# Patient Record
Sex: FEMALE | Race: BLACK OR AFRICAN AMERICAN | NOT HISPANIC OR LATINO | Employment: OTHER | URBAN - METROPOLITAN AREA
[De-identification: names, ages, dates, MRNs, and addresses within clinical notes are randomized per-mention and may not be internally consistent; named-entity substitution may affect disease eponyms.]

---

## 2019-09-09 ENCOUNTER — APPOINTMENT (OUTPATIENT)
Dept: LAB | Facility: HOSPITAL | Age: 64
End: 2019-09-09

## 2019-09-09 ENCOUNTER — CONSULT (OUTPATIENT)
Dept: ONCOLOGY | Facility: CLINIC | Age: 64
End: 2019-09-09

## 2019-09-09 VITALS
HEART RATE: 82 BPM | TEMPERATURE: 97.8 F | BODY MASS INDEX: 33.78 KG/M2 | DIASTOLIC BLOOD PRESSURE: 80 MMHG | SYSTOLIC BLOOD PRESSURE: 127 MMHG | HEIGHT: 66 IN | RESPIRATION RATE: 20 BRPM | WEIGHT: 210.2 LBS

## 2019-09-09 DIAGNOSIS — C50.919 METASTATIC BREAST CANCER: Primary | ICD-10-CM

## 2019-09-09 PROCEDURE — 99205 OFFICE O/P NEW HI 60 MIN: CPT | Performed by: INTERNAL MEDICINE

## 2019-09-09 PROCEDURE — 96450 CHEMOTHERAPY INTO CNS: CPT | Performed by: INTERNAL MEDICINE

## 2019-09-09 PROCEDURE — G0463 HOSPITAL OUTPT CLINIC VISIT: HCPCS | Performed by: INTERNAL MEDICINE

## 2019-09-09 RX ORDER — ANASTROZOLE 1 MG/1
1 TABLET ORAL DAILY
COMMUNITY

## 2019-09-09 RX ORDER — FLUTICASONE PROPIONATE 50 MCG
2 SPRAY, SUSPENSION (ML) NASAL DAILY
COMMUNITY

## 2019-09-09 RX ORDER — POTASSIUM CHLORIDE 750 MG/1
10 TABLET, FILM COATED, EXTENDED RELEASE ORAL 3 TIMES DAILY
COMMUNITY

## 2019-09-09 RX ORDER — METOPROLOL SUCCINATE 50 MG/1
50 TABLET, EXTENDED RELEASE ORAL DAILY
COMMUNITY

## 2019-09-09 RX ORDER — FUROSEMIDE 40 MG/1
40 TABLET ORAL 2 TIMES DAILY
COMMUNITY

## 2019-09-09 RX ORDER — HYDROCODONE BITARTRATE AND ACETAMINOPHEN 10; 325 MG/1; MG/1
1 TABLET ORAL EVERY 6 HOURS PRN
COMMUNITY

## 2019-09-09 RX ORDER — ATORVASTATIN CALCIUM 40 MG/1
40 TABLET, FILM COATED ORAL DAILY
COMMUNITY

## 2019-09-09 RX ORDER — GUAIFENESIN AND DEXTROMETHORPHAN HYDROBROMIDE 100; 10 MG/5ML; MG/5ML
5 SOLUTION ORAL AS NEEDED
COMMUNITY

## 2019-09-09 RX ORDER — CETIRIZINE HYDROCHLORIDE 10 MG/1
10 TABLET ORAL DAILY
COMMUNITY

## 2019-09-09 NOTE — PROGRESS NOTES
Genetic Note    Cassandra Cheng [place]  1955    Primary Care Physician: Provider, No Known  Referring Physician: Provider, No Known  Reason For Visit: High Risk Evaluation For  Breast cancer    Chief Complaint   Patient presents with   • Appointment     New patient for genetic testing       HPI  64-year-old female who has a history of stage II left breast cancer back in 2006.  Patient at the time was treated with left lumpectomy followed by chemotherapy and subsequently radiation treatments.  She received antiestrogen therapy for at least 5 years.  Patient did well without any evidence of relapsed disease up until May 2018 when she was found to have metastatic disease with new right breast cancer.  Patient is currently on Arimidex single agent for her metastatic disease.  Says this is her second primary breast cancer, patient has been referred for further evaluation for possible genetic mutation that could have led to her malignancies and also to help to determine treatment recommendations if she is found to have BRCA1 mutation.  And has no family history of malignancies on both sides of the family.  Both grandparents lived well into their old age he is.  Past patient knows there has not been any reported family history of cancer.  Patient is accompanied today by her family for this appointment.    Past Medical History:   Diagnosis Date   • Breast cancer (CMS/HCC)    • CHF (congestive heart failure) (CMS/HCC)    • Hypertension    • Osteoporosis    • Pre-diabetes        Past Surgical History:   Procedure Laterality Date   • ANKLE SURGERY Right    • BREAST LUMPECTOMY Left    • LAPAROSCOPIC TUBAL LIGATION           Current Outpatient Medications:   •  anastrozole (ARIMIDEX) 1 MG tablet, Take 1 mg by mouth Daily., Disp: , Rfl:   •  atorvastatin (LIPITOR) 40 MG tablet, Take 40 mg by mouth Daily., Disp: , Rfl:   •  Calcium Carb-Cholecalciferol (CALCIUM 1000 + D) 1000-800 MG-UNIT tablet, Take 2 tablets by mouth 2 (Two)  Times a Day., Disp: , Rfl:   •  cetirizine (zyrTEC) 10 MG tablet, Take 10 mg by mouth Daily., Disp: , Rfl:   •  dextromethorphan-guaifenesin (ROBITUSSIN-DM)  MG/5ML syrup, Take 5 mL by mouth As Needed., Disp: , Rfl:   •  fluticasone (FLONASE) 50 MCG/ACT nasal spray, 2 sprays into the nostril(s) as directed by provider Daily., Disp: , Rfl:   •  furosemide (LASIX) 40 MG tablet, Take 40 mg by mouth 2 (Two) Times a Day., Disp: , Rfl:   •  HYDROcodone-acetaminophen (NORCO)  MG per tablet, Take 1 tablet by mouth Every 6 (Six) Hours As Needed for Moderate Pain ., Disp: , Rfl:   •  metFORMIN (GLUCOPHAGE) 500 MG tablet, Take 500 mg by mouth 2 (Two) Times a Day., Disp: , Rfl:   •  metoprolol succinate XL (TOPROL-XL) 50 MG 24 hr tablet, Take 50 mg by mouth Daily. 1/2 TAB DAILY, Disp: , Rfl:   •  Polyethyl Glycol-Polyvinyl Alc 1-1 % solution, Apply 1 drop to eye(s) as directed by provider 4 (Four) Times a Day., Disp: , Rfl:   •  potassium chloride (K-DUR) 10 MEQ CR tablet, Take 10 mEq by mouth 3 (Three) Times a Day., Disp: , Rfl:     No Known Allergies    History reviewed. No pertinent family history.    Cancer-related family history is not on file.    Social History     Tobacco Use   • Smoking status: Former Smoker     Types: Cigarettes     Last attempt to quit: 1979     Years since quittin.7   • Smokeless tobacco: Never Used   Substance Use Topics   • Alcohol use: No     Frequency: Never   • Drug use: No     GYN History:  The patient stated her menses at the age of 10.  Her last menstrual cycle was at the age of 50.  Her menses were regular with heavy blood loss lasting 5-7 days.  The patient was 24 years of age with her first childbirth.  She breast fed her children.  She has no history of taking birth control pills nor hormone replacement.      Review of Systems   Constitutional: Negative for chills and fever.   HENT: Negative for ear pain, mouth sores, nosebleeds and sore throat.    Eyes: Negative for  "photophobia and visual disturbance.   Respiratory: Negative for wheezing and stridor.    Cardiovascular: Negative for chest pain and palpitations.   Gastrointestinal: Negative for abdominal pain, diarrhea, nausea and vomiting.   Endocrine: Negative for cold intolerance and heat intolerance.   Genitourinary: Negative for dysuria and hematuria.   Musculoskeletal: Negative for joint swelling and neck stiffness.   Skin: Negative for color change and rash.   Neurological: Negative for seizures and syncope.   Hematological: Negative for adenopathy.        No obvious bleeding   Psychiatric/Behavioral: Negative for agitation, confusion and hallucinations.       Objective:    Vitals:    09/09/19 1406   BP: 127/80   Pulse: 82   Resp: 20   Temp: 97.8 °F (36.6 °C)   Weight: 95.3 kg (210 lb 3.2 oz)   Height: 167.6 cm (66\")   PainSc: 0-No pain       (1) Restricted in physically strenuous activity, ambulatory and able to do work of light nature    Physical Exam   Constitutional: She is oriented to person, place, and time. No distress.   HENT:   Head: Normocephalic and atraumatic.   Eyes: Conjunctivae and EOM are normal. Right eye exhibits no discharge. Left eye exhibits no discharge. No scleral icterus.   Neck: Normal range of motion. Neck supple. No thyromegaly present.   Cardiovascular: Normal rate, regular rhythm and normal heart sounds. Exam reveals no gallop and no friction rub.   Pulmonary/Chest: Effort normal. No stridor. No respiratory distress. She has no wheezes. Right breast exhibits no inverted nipple, no mass, no nipple discharge, no skin change and no tenderness. Left breast exhibits no inverted nipple, no mass, no nipple discharge, no skin change and no tenderness. Breasts are symmetrical.   Abdominal: Soft. Bowel sounds are normal. She exhibits no mass. There is no tenderness. There is no rebound and no guarding.   Genitourinary: No breast bleeding.   Musculoskeletal: Normal range of motion. She exhibits no " tenderness.   Lymphadenopathy:     She has no cervical adenopathy.   Neurological: She is alert and oriented to person, place, and time. She exhibits normal muscle tone.   Skin: Skin is warm. No rash noted. She is not diaphoretic. No erythema.   Psychiatric: She has a normal mood and affect. Her behavior is normal.   Nursing note and vitals reviewed.      Assessment/Plan     There are no diagnoses linked to this encounter.    1. Personal history of 2 separate breast malignancies worrisome for headache 3 breast cancer syndrome.  2. Metastatic breast cancer  3. Genetic counseling    Discussion:  Patient has had 2 separate breast primaries.  We reviewed indications for genetic testing which would include more than 1 breast cancer in an individual, breast and ovarian cancer in the same individual, cancers occurring in very young individuals, multiple family members having similar malignancies. Due to the fact that she has had 2 separate breast primaries, there is concern for possible hereditary breast cancer syndrome.  Patient is a good candidate to be screened as she has been affected by malignancy.  If she has positive mutation in the BRCA  gene, she may benefit from Parp inhibitors.  She is currently on single agent Arimidex.  I have requested for additional records from the [facility] including her most recent mammogram.    Today's risk assessment is based on the history the patient has provided.  We discussed that the best people to screen are the ones who have been affected by malignancy as their result is more informative.  We reviewed all the hallmarks of hereditary cancer syndrome including multiple relatives on the same side of the family being affected by malignancy, cancer diagnosis in young individuals, different cancers in one individual.      We discussed the implications of a positive deleterious mutation which can result in recommendations for prophylactic surgeries, chemoprevention, lifestyle  modifications and aggressive screening with mammogram and MRI, and also increased breast awareness and breast self exams.  Patient understands if she screens positive, then at-risk family members will need to be screened as well.  Each offspring has a 50% chance of inheriting a deleterious mutation if positive in a parent.      A negative deleterious mutation will make hereditary cancer syndrome much less likely, but does not rule out the possibility of a gene mutation that cannot be detected with the available technology.  She also understands that a negative gene test result might indicate that the cancers that she had was most likely sporadic.    We discussed variant of unknown significance.  Patient understands that no medical decisions will be made based on a variant of unknown significance, but I did stress the importance of maintaining contact information with us should this be the case.      We discussed the financial implications of the above testing.  Patient understands when the right clinical criteria are met that most insurance companies will cover the cost.      We also discussed the various insurability issues with a deleterious mutation result.     Patient has give us consent to proceed with comprehensive genetic analysis through [facility]    She will return to see me in approximately 6 weeks to review the results and to make further recommendations.    Thank you very much allowing me participate in the care of Ms. Dey.  I will keep you updated on her progress.      I spent greater than 40 minutes in face-to-face time with the patient and 95% of that time were spent in counseling and coordination of care, including review of  indications for treatment, goals of therapy, alternatives and risks - both common and rare - as well as surveillance and potential outcomes.                 Part of this document was scribed by Connie Black RN, BSN.        Much of the above report is an electronic  transcription/translation of the spoken language to printed text using Dragon Software. As such, the subtleties and finesse of the spoken language may permit erroneous, or at times, nonsensical words or phrases to be inadvertently transcribed; thus changes may be made at a later date to rectify these errors.

## 2019-10-14 ENCOUNTER — TELEPHONE (OUTPATIENT)
Dept: ONCOLOGY | Facility: CLINIC | Age: 64
End: 2019-10-14

## 2019-10-14 ENCOUNTER — APPOINTMENT (OUTPATIENT)
Dept: LAB | Facility: HOSPITAL | Age: 64
End: 2019-10-14

## 2019-10-14 ENCOUNTER — APPOINTMENT (OUTPATIENT)
Dept: ONCOLOGY | Facility: CLINIC | Age: 64
End: 2019-10-14

## 2019-10-14 NOTE — TELEPHONE ENCOUNTER
Ms. Dey left message to cancel her appt for today.  Returned call, rescheduled for Thursday, 10/17.

## 2019-10-16 NOTE — PROGRESS NOTES
Genetic Note    Cassandra Cheng [place]  1955    Primary Care Physician: Provider, No Known  Referring Physician: Provider, No Known  Reason For Visit: High Risk Evaluation For  Breast cancer    Chief Complaint   Patient presents with   • Follow-up     Discuss genetic results       HPI     64-year-old female who has a history of stage II left breast cancer back in 2006.  Patient at the time was treated with left lumpectomy followed by chemotherapy and subsequently radiation treatments.  She received antiestrogen therapy for at least 5 years.  Patient did well without any evidence of relapsed disease up until May 2018 when she was found to have metastatic disease with new right breast cancer.  Patient is currently on Arimidex single agent for her metastatic disease.  Says this is her second primary breast cancer, patient has been referred for further evaluation for possible genetic mutation that could have led to her malignancies and also to help to determine treatment recommendations if she is found to have BRCA1 mutation.    She has no family history of malignancies on both sides of the family.  Both grandparents lived well into their old ages. Patient  states there has not been any reported family history of cancer.    · 9/9/2019 patient had comprehensive gene analysis with cancer next technology.  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  Otherwise the rest of the genes were essentially unremarkable for any significant mutation.      Past Medical History:   Diagnosis Date   • Breast cancer (CMS/HCC)     x 2 (once in 2006 and then 5/2019)   • CHF (congestive heart failure) (CMS/HCC)    • Hypertension    • Osteoporosis    • Pre-diabetes        Past Surgical History:   Procedure Laterality Date   • ANKLE SURGERY Right    • BREAST LUMPECTOMY Left    • LAPAROSCOPIC TUBAL LIGATION           Current Outpatient Medications:   •  anastrozole (ARIMIDEX) 1 MG  tablet, Take 1 mg by mouth Daily., Disp: , Rfl:   •  atorvastatin (LIPITOR) 40 MG tablet, Take 40 mg by mouth Daily., Disp: , Rfl:   •  Calcium Carb-Cholecalciferol (CALCIUM 1000 + D) 1000-800 MG-UNIT tablet, Take 2 tablets by mouth 2 (Two) Times a Day., Disp: , Rfl:   •  cetirizine (zyrTEC) 10 MG tablet, Take 10 mg by mouth Daily., Disp: , Rfl:   •  dextromethorphan-guaifenesin (ROBITUSSIN-DM)  MG/5ML syrup, Take 5 mL by mouth As Needed., Disp: , Rfl:   •  fluticasone (FLONASE) 50 MCG/ACT nasal spray, 2 sprays into the nostril(s) as directed by provider Daily., Disp: , Rfl:   •  furosemide (LASIX) 40 MG tablet, Take 40 mg by mouth 2 (Two) Times a Day., Disp: , Rfl:   •  HYDROcodone-acetaminophen (NORCO)  MG per tablet, Take 1 tablet by mouth Every 6 (Six) Hours As Needed for Moderate Pain ., Disp: , Rfl:   •  metoprolol succinate XL (TOPROL-XL) 50 MG 24 hr tablet, Take 50 mg by mouth Daily. 1/2 TAB DAILY, Disp: , Rfl:   •  Polyethyl Glycol-Polyvinyl Alc 1-1 % solution, Apply 1 drop to eye(s) as directed by provider 4 (Four) Times a Day., Disp: , Rfl:   •  potassium chloride (K-DUR) 10 MEQ CR tablet, Take 10 mEq by mouth 3 (Three) Times a Day., Disp: , Rfl:   •  metFORMIN (GLUCOPHAGE) 500 MG tablet, Take 500 mg by mouth 2 (Two) Times a Day., Disp: , Rfl:     Allergies   Allergen Reactions   • Amoxicillin Unknown (See Comments)     Unknown   • Codeine Unknown (See Comments)     Unknown   • Lisinopril Swelling       No family history on file.    Cancer-related family history is not on file.    Social History     Tobacco Use   • Smoking status: Former Smoker     Types: Cigarettes     Last attempt to quit: 1979     Years since quittin.8   • Smokeless tobacco: Never Used   Substance Use Topics   • Alcohol use: No     Frequency: Never   • Drug use: No     GYN History:  The patient stated her menses at the age of 10.  Her last menstrual cycle was at the age of 50.  Her menses were regular with heavy blood  "loss lasting 5-7 days.  The patient was 24 years of age with her first childbirth.  She breast fed her children.  She has no history of taking birth control pills nor hormone replacement.      Review of Systems   Constitutional: Negative for chills and fever.   HENT: Negative for ear pain, mouth sores, nosebleeds and sore throat.    Eyes: Negative for photophobia and visual disturbance.   Respiratory: Negative for wheezing and stridor.    Cardiovascular: Negative for chest pain and palpitations.   Gastrointestinal: Negative for abdominal pain, diarrhea, nausea and vomiting.   Endocrine: Negative for cold intolerance and heat intolerance.   Genitourinary: Negative for dysuria and hematuria.   Musculoskeletal: Negative for joint swelling and neck stiffness.   Skin: Negative for color change and rash.   Neurological: Negative for seizures and syncope.   Hematological: Negative for adenopathy.        No obvious bleeding   Psychiatric/Behavioral: Negative for agitation, confusion and hallucinations.       Objective:    Vitals:    10/17/19 1344   BP: 135/83   Pulse: 74   Resp: 20   Temp: 97.8 °F (36.6 °C)   Weight: 98.3 kg (216 lb 12.8 oz)   Height: 167.6 cm (66\")   PainSc:   2   PainLoc: Knee       (1) Restricted in physically strenuous activity, ambulatory and able to do work of light nature    Physical Exam   Constitutional: She is oriented to person, place, and time. No distress.   HENT:   Head: Normocephalic and atraumatic.   Eyes: Conjunctivae and EOM are normal. Right eye exhibits no discharge. Left eye exhibits no discharge. No scleral icterus.   Neck: Normal range of motion. Neck supple. No thyromegaly present.   Cardiovascular: Normal rate, regular rhythm and normal heart sounds. Exam reveals no gallop and no friction rub.   Pulmonary/Chest: Effort normal. No stridor. No respiratory distress. She has no wheezes. Right breast exhibits no inverted nipple, no mass, no nipple discharge, no skin change and no " tenderness. Left breast exhibits no inverted nipple, no mass, no nipple discharge, no skin change and no tenderness. Breasts are symmetrical.   Abdominal: Soft. Bowel sounds are normal. She exhibits no mass. There is no tenderness. There is no rebound and no guarding.   Genitourinary: No breast bleeding.   Musculoskeletal: Normal range of motion. She exhibits no tenderness.   Lymphadenopathy:     She has no cervical adenopathy.   Neurological: She is alert and oriented to person, place, and time. She exhibits normal muscle tone.   Skin: Skin is warm. No rash noted. She is not diaphoretic. No erythema.   Psychiatric: She has a normal mood and affect. Her behavior is normal.   Nursing note and vitals reviewed.      Assessment/Plan     There are no diagnoses linked to this encounter.    1. Personal history of 2 separate breast malignancies worrisome for headache 3 breast cancer syndrome.  2. Metastatic breast cancer  3. 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 Genetic counseling    Discussion:  Patient has had 2 separate breast primaries.  We reviewed indications for genetic testing which would include more than 1 breast cancer in an individual, breast and ovarian cancer in the same individual, cancers occurring in very young individuals, multiple family members having similar malignancies. Due to the fact that she has had 2 separate breast primaries, there is concern for possible hereditary breast cancer syndrome.  Patient is a good candidate to be screened as she has been affected by malignancy.   She is currently on single agent Arimidex.  I have requested for additional records from the [facility] including her most recent mammogram.    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  We discussed her VUS:    Based on currently available data  it is unclear whether this VUS is pathogenic or benign variant.  80% of variant of unknown significance will return as benign, 20% will truly be pathogenic.  Therefore, I did recommend to patient that there is need to maintain contact information with us for updates on VUS reclassification.  At this point in time, we cannot use variant of unknown significance to make any clinical decisions for patient and family members.  Clinical decisions will be based on personal history, family history and any positive pathogenic mutations identified.  We also discussed about variant reclassification program.  Patient has been given information to follow up with the genetic lab if there is interest to participate in this study, which may help to clarify this variant in the future.    We have reviewed patient's results.  She has screened negative for any deleterious mutation that could increase her risk for developing cancer.  I explained to patient that the cancers that occurred in her family may have been sporadic or could still be due to a mutation that we are not able to detect with the available technology.  There are other genes suspected to increase risk breast cancer that are yet to be identified.  About 5% to 10% of all breast cancers are due to genetic mutation, the rest are due to hormonal, reproductive, endocrinology and lifestyle issues.  Patient will continue to monitor her family cancer history and update us of any changes that occur in the future.  We discussed the concept of familial breast cancer syndrome, which could play a role in her family cancer development.     We discussed general breast health care such as increased breast awareness, monthly breast self- exams, clinical breast exam and annual mammograms.      We discussed signs and symptoms for patient to watch out for and notify us should they occur including breast lumps, nipple discharge, skin discoloration, breast pain or any other concerns she may  have     A copy of her genetic results have been given to patient for her own records keeping. Patient will follow up with Dr. Jackson  for her ongoing breast cancer care.      Patient has been given opportunities to ask questions, which have all been answered to the best of abilities.           Thank you very much allowing me participate in the care of Ms. Dey.  Please do not hesitate to contact me should the need arise in the future      I spent greater than 40 minutes in face-to-face time with the patient and 95% of that time were spent in counseling and coordination of care, including review of  indications for treatment, goals of therapy, alternatives and risks - both common and rare - as well as surveillance and potential outcomes.           Part of this document was scribed by Connie Black RN, BSN.        Much of the above report is an electronic transcription/translation of the spoken language to printed text using Dragon Software. As such, the subtleties and finesse of the spoken language may permit erroneous, or at times, nonsensical words or phrases to be inadvertently transcribed; thus changes may be made at a later date to rectify these errors.

## 2019-10-17 ENCOUNTER — APPOINTMENT (OUTPATIENT)
Dept: LAB | Facility: HOSPITAL | Age: 64
End: 2019-10-17

## 2019-10-17 ENCOUNTER — OFFICE VISIT (OUTPATIENT)
Dept: ONCOLOGY | Facility: CLINIC | Age: 64
End: 2019-10-17

## 2019-10-17 VITALS
DIASTOLIC BLOOD PRESSURE: 83 MMHG | RESPIRATION RATE: 20 BRPM | HEART RATE: 74 BPM | BODY MASS INDEX: 34.84 KG/M2 | HEIGHT: 66 IN | WEIGHT: 216.8 LBS | SYSTOLIC BLOOD PRESSURE: 135 MMHG | TEMPERATURE: 97.8 F

## 2019-10-17 DIAGNOSIS — Z71.83 ENCOUNTER FOR NONPROCREATIVE GENETIC COUNSELING: Primary | ICD-10-CM

## 2019-10-17 PROCEDURE — G0463 HOSPITAL OUTPT CLINIC VISIT: HCPCS | Performed by: INTERNAL MEDICINE

## 2019-10-17 PROCEDURE — 99215 OFFICE O/P EST HI 40 MIN: CPT | Performed by: INTERNAL MEDICINE
